# Patient Record
(demographics unavailable — no encounter records)

---

## 2025-04-15 NOTE — HISTORY OF PRESENT ILLNESS
[FreeTextEntry1] : 04/15/2025 cc possible infertility  Pt is a 35 year old male presenting for a new patient visit with c/o possible infertility. Pt reports trying to have a baby for the past year. Pt reports being sexually active with his partner for the past 5 years without protection. Pt reports his urination is fine. Pt's partner is prone to yeast infections which causes occasionally burning on glans which resolves on its own usually. Pt denies smoking and drug use. Pt reports drinking socially (1x per 2 weeks). Pt denies impregnating anyone before.

## 2025-04-15 NOTE — END OF VISIT
[FreeTextEntry4] : This note was written by Svetlana Orourke on 04/15/2025 actively solely Sandro Santiago M.D. I, Svetlana Orourke, am scribing for and in the presence of Sandro Santiago M.D. in the following sections HISTORY OF PRESENT ILLNESS, PAST MEDICAL/FAMILY/SOCIAL HISTORY; REVIEW OF SYSTEMS; VITAL SIGNS; PHYSICAL EXAM; ASSESSMENT/PLAN. All medical record entries made by this scribe at , Sandro Santiago M.D. direction and personally dictated by me on 04/15/2025. I personally performed the services described in the documentation, reviewed the documentation recorded by the scribe in my presence, and it accurately and completely records my words and actions.

## 2025-04-15 NOTE — PHYSICAL EXAM
-- DO NOT REPLY / DO NOT REPLY ALL --  -- Message is from [facility] ([facility]) --    ONLY TO BE USED WITHIN A REFILL MEDICATION ENCOUNTER    Med Refill  Is the patient currently having any symptoms?: No/Non-Emergent symptoms    Name of medication requested: See pended med    Is this the first request for the medication in the last 48 hours?: Yes    Patient is requesting a medication refill - medication is on active medication list    Patient is currently OUT of the requested medication - sent as HIGH priority    Full name of the provider who ordered the medication: Kyung MONGE, Yovana MACDONALD      [id number]    Clinic site name / Account # for provider: [facility] - [address]       Preferred Pharmacy: Pharmacy  [id number] [address] [facility]    Patient confirmed the above pharmacy as correct?  Yes    Caller Information         Type Contact Phone/Fax    02/16/2024 08:09 AM CST Phone (Incoming) Edin Xiong (Self) [phone] (M)            Alternative phone number: no    Can a detailed message be left?: Yes         [Normal Appearance] : normal appearance [Well Groomed] : well groomed [General Appearance - In No Acute Distress] : no acute distress [Edema] : no peripheral edema [Respiration, Rhythm And Depth] : normal respiratory rhythm and effort [Exaggerated Use Of Accessory Muscles For Inspiration] : no accessory muscle use [Abdomen Soft] : soft [Abdomen Tenderness] : non-tender [Costovertebral Angle Tenderness] : no ~M costovertebral angle tenderness [Urinary Bladder Findings] : the bladder was normal on palpation [Normal Station and Gait] : the gait and station were normal for the patient's age [] : no rash [No Focal Deficits] : no focal deficits [Oriented To Time, Place, And Person] : oriented to person, place, and time [Affect] : the affect was normal [Mood] : the mood was normal [No Palpable Adenopathy] : no palpable adenopathy [de-identified] : mild candy varicocle